# Patient Record
Sex: FEMALE | Race: WHITE
[De-identification: names, ages, dates, MRNs, and addresses within clinical notes are randomized per-mention and may not be internally consistent; named-entity substitution may affect disease eponyms.]

---

## 2017-05-08 ENCOUNTER — HOSPITAL ENCOUNTER (OUTPATIENT)
Dept: HOSPITAL 45 - C.RAD1850 | Age: 62
Discharge: HOME | End: 2017-05-08
Attending: FAMILY MEDICINE
Payer: COMMERCIAL

## 2017-05-08 DIAGNOSIS — R07.9: Primary | ICD-10-CM

## 2017-05-08 NOTE — DIAGNOSTIC IMAGING REPORT
LEFT RIBS UNILATERAL WITH PA CHEST



CLINICAL HISTORY: Left anterior rib pain    



COMPARISON STUDY:  No previous studies for comparison.



FINDINGS: The heart is mildly enlarged. No pneumothorax is visualized. There is

no focal pulmonary consolidation. No left-sided rib fractures are visualized.



IMPRESSION:  No evidence of pneumothorax. No left-sided rib fractures are

visualized. 









Electronically signed by:  Theodore Rizvi M.D.

5/8/2017 3:17 PM



Dictated Date/Time:  5/8/2017 3:16 PM

## 2017-12-18 ENCOUNTER — HOSPITAL ENCOUNTER (EMERGENCY)
Dept: HOSPITAL 45 - C.EDC | Age: 62
Discharge: HOME | End: 2017-12-18
Payer: COMMERCIAL

## 2017-12-18 VITALS
WEIGHT: 226.19 LBS | HEIGHT: 70 IN | HEIGHT: 70 IN | BODY MASS INDEX: 32.38 KG/M2 | BODY MASS INDEX: 32.38 KG/M2 | WEIGHT: 226.19 LBS

## 2017-12-18 VITALS — OXYGEN SATURATION: 98 %

## 2017-12-18 VITALS — HEART RATE: 88 BPM | OXYGEN SATURATION: 98 % | SYSTOLIC BLOOD PRESSURE: 115 MMHG | DIASTOLIC BLOOD PRESSURE: 79 MMHG

## 2017-12-18 VITALS — TEMPERATURE: 99.68 F

## 2017-12-18 DIAGNOSIS — F17.200: ICD-10-CM

## 2017-12-18 DIAGNOSIS — Z87.442: ICD-10-CM

## 2017-12-18 DIAGNOSIS — I10: ICD-10-CM

## 2017-12-18 DIAGNOSIS — Z80.9: ICD-10-CM

## 2017-12-18 DIAGNOSIS — R53.1: Primary | ICD-10-CM

## 2017-12-18 DIAGNOSIS — E87.1: ICD-10-CM

## 2017-12-18 DIAGNOSIS — J18.9: ICD-10-CM

## 2017-12-18 DIAGNOSIS — M79.669: ICD-10-CM

## 2017-12-18 DIAGNOSIS — Z79.899: ICD-10-CM

## 2017-12-18 LAB
ALP SERPL-CCNC: 106 U/L (ref 45–117)
ALT SERPL-CCNC: 19 U/L (ref 12–78)
ANION GAP SERPL CALC-SCNC: 11 MMOL/L (ref 3–11)
AST SERPL-CCNC: 15 U/L (ref 15–37)
BASOPHILS # BLD: 0.03 K/UL (ref 0–0.2)
BASOPHILS NFR BLD: 0.4 %
BUN SERPL-MCNC: 14 MG/DL (ref 7–18)
BUN/CREAT SERPL: 14.9 (ref 10–20)
CALCIUM SERPL-MCNC: 8.5 MG/DL (ref 8.5–10.1)
CHLORIDE SERPL-SCNC: 93 MMOL/L (ref 98–107)
CKMB/CK RATIO: (no result) (ref 0–3)
CO2 SERPL-SCNC: 22 MMOL/L (ref 21–32)
COMPLETE: YES
CREAT CL PREDICTED SERPL C-G-VRATE: 84.3 ML/MIN
CREAT SERPL-MCNC: 0.92 MG/DL (ref 0.6–1.2)
EOSINOPHIL NFR BLD AUTO: 166 K/UL (ref 130–400)
GLUCOSE SERPL-MCNC: 113 MG/DL (ref 70–99)
HCT VFR BLD CALC: 42.1 % (ref 37–47)
IG%: 0.1 %
IMM GRANULOCYTES NFR BLD AUTO: 21.8 %
INR PPP: 1 (ref 0.9–1.1)
LYMPHOCYTES # BLD: 1.52 K/UL (ref 1.2–3.4)
MAGNESIUM SERPL-MCNC: 2 MG/DL (ref 1.8–2.4)
MCH RBC QN AUTO: 33.3 PG (ref 25–34)
MCHC RBC AUTO-ENTMCNC: 36.3 G/DL (ref 32–36)
MCV RBC AUTO: 91.7 FL (ref 80–100)
MONOCYTES NFR BLD: 19.8 %
NEUTROPHILS # BLD AUTO: 0.3 %
NEUTROPHILS NFR BLD AUTO: 57.6 %
PARTIAL THROMBOPLASTIN RATIO: 1.2
PMV BLD AUTO: 11 FL (ref 7.4–10.4)
POTASSIUM SERPL-SCNC: 3.8 MMOL/L (ref 3.5–5.1)
PROTHROMBIN TIME: 10.7 SECONDS (ref 9–12)
RBC # BLD AUTO: 4.59 M/UL (ref 4.2–5.4)
SODIUM SERPL-SCNC: 126 MMOL/L (ref 136–145)
TSH SERPL-ACNC: 1.12 UIU/ML (ref 0.3–4.5)
WBC # BLD AUTO: 6.98 K/UL (ref 4.8–10.8)

## 2017-12-18 NOTE — DIAGNOSTIC IMAGING REPORT
CHEST ONE VIEW PORTABLE



CLINICAL HISTORY: Altered mental status. Weakness.    



COMPARISON STUDY:  11/10/2015



FINDINGS: The heart is the upper limits of normal in size. Since the prior

study, the patient has developed a right perihilar airspace opacity. In the

proper clinical setting, this represents a pneumonia. Underlying mass cannot be

excluded. Clinical and radiographic follow-up is recommended[ 



IMPRESSION: 

1. Interval development of a right perihilar airspace opacity. While likely

representing a focal pneumonia, an underlying mass cannot be excluded. Clinical

and radiographic follow-up is recommended







Electronically signed by:  Theodore Rizvi M.D.

12/18/2017 1:10 PM



Dictated Date/Time:  12/18/2017 1:08 PM

## 2017-12-18 NOTE — EMERGENCY ROOM VISIT NOTE
History


Report prepared by Silvio:  Etienne Currie


Under the Supervision of:  Dr. Esvin Prescott D.O.


First contact with patient:  12:04


Chief Complaint:  STROKE SYMPTOMS


Stated Complaint:  WEAKNESS


Nursing Triage Summary:  


since saturday pt has been having devon leg weakness, also had some nausea. this 


am about 0800 developed some dizzines lasted only briefly, felt better after 


sitting down. pt drove self to [facility] office. pt sent her by ALS for stroke sx 

and 


for a CT scan.





History of Present Illness


The patient is a 62 year old female who presents to the Emergency Room via ALS 

with complaints of persistent bilateral leg weakness that started 2 days ago. 

She states that she has had some nausea and coughing since then as well. The 

patient notes that she woke up around 4 hour ago with brief dizziness that 

resolved upon sitting down. She then drove herself to her doctor's office, and 

was then sent here for a stroke workup. The patient denies any leg pain, leg 

swelling, headaches, chest pain, shortness of breath, or vomiting. She does add 

that she has been urinating a lot more the past few days. She is a smoker.





   Source of History:  patient, nursing staff


   Onset:  2 days ago


   Position:  leg (bilateral)


   Quality:  other (weakness)


   Timing:  other (persistent)


   Associated Symptoms:  + cough, + nausea, + urinary symptoms (increased 

frequency), No headache, No chest pain, No SOB, No vomiting


Note:


Associated symptoms: Brief dizziness. Denies leg pain, leg swelling.





Review of Systems


See HPI for pertinent positives & negatives. A total of 10 systems reviewed and 

were otherwise negative.





Past Medical & Surgical


Medical Problems:


(1) HTN (hypertension)


(2) Kidney stones








Family History





Cancer


Lung disease





Social History


Smoking Status:  Current Some Day Smoker


Housing Status:  lives alone


Occupation Status:  employed





Current/Historical Medications


Scheduled


Flecainide (Tambocor), 150 MG PO Q12


Fluticasone Prop/Salmeterol (Advair Diskus 100/50 60 Dose), 1 PUFF INH BID


Gabapentin (Neurontin), 200 MG PO BID


Levofloxacin (Levaquin), 500 MG PO DAILY


Metoprolol Tartrate (Lopressor), 100 MG PO BID


Tiotropium Bromide (Spiriva Handihaler), 1 CAP INH BID





Allergies


Coded Allergies:  


     No Known Allergies (Unverified , 12/18/17)





Physical Exam


Vital Signs











  Date Time  Temp Pulse Resp B/P (MAP) Pulse Ox O2 Delivery O2 Flow Rate FiO2


 


12/18/17 14:30  88 16 115/79 98   


 


12/18/17 13:36  72 16 117/81 95 Room Air  


 


12/18/17 12:51  74      


 


12/18/17 12:26     98 Room Air  


 


12/18/17 12:26     98 Room Air  


 


12/18/17 12:26  75 16 145/76 98 Room Air  





  72  142/74    





  69  142/76    


 


12/18/17 12:05 37.6 82 20 129/75 95 Room Air  











Physical Exam


GENERAL:  Patient is awake, alert, and in no acute distress. Patient is resting 

comfortably and showing no signs of anxiety


EYES: The conjunctivae are clear.  The pupils are round and reactive. 


EARS, NOSE, MOUTH AND THROAT: The nose is without any evidence of any 

deformity. Mucous membranes are moist tongue is midline 


NECK: The neck is nontender and supple.


RESPIRATORY: Normal respiratory effort is noted there is no evidence of 

wheezing rhonchi or rales


CARDIOVASCULAR:  Regular rate and rhythm noted there no murmurs rubs or gallops 

normal S1 normal S2 


GASTROINTESTINAL: The abdomen is soft. Bowel sounds are present in all 

quadrants. Abdomen is nontender


MUSCULOSKELETAL/EXTREMITIES: There is no evidence of gross deformity full range 

of motion is noted in the hips and shoulders


SKIN: There is no obvious evidence of any rash. There are no petechiae, pallor 

or cyanosis noted. 


NEUROLOGIC:  Patient is awake alert and oriented x3 strength is symmetric 

patellar reflexes are 2+ bilaterally





Medical Decision & Procedures


ER Provider


Diagnostic Interpretation:


Radiology results as stated below per my review and radiologist interpretation:








CT HEAD WITHOUT CONTRAST (CT)





CLINICAL HISTORY: Altered mental status. Weakness.    





COMPARISON STUDY:  No previous studies for comparison.





TECHNIQUE:  Axial CT of the brain is performed from the vertex to the skull


base. IV contrast was not administered for this examination. A dose lowering


technique was utilized adhering to the principles of ALARA.


 





CT DOSE: 2006.90 mGycm





FINDINGS:





No intra or extra-axial mass lesions are visualized. There is no CT evidence of


acute cortical infarction. There is no evidence of midline shift. There is no


acute  hemorrhage. No calvarial fractures are visualized. 


There are minor white matter hypodensities likely on a small vessel basis.


There is no evidence of pathologic ventricular dilatation.


There is no evidence of acute sinusitis





IMPRESSION: No acute intracranial findings








Electronically signed by:  Theodore Rizvi M.D.


12/18/2017 1:08 PM





Dictated Date/Time:  12/18/2017 1:07 PM











CHEST ONE VIEW PORTABLE





CLINICAL HISTORY: Altered mental status. Weakness.    





COMPARISON STUDY:  11/10/2015





FINDINGS: The heart is the upper limits of normal in size. Since the prior


study, the patient has developed a right perihilar airspace opacity. In the


proper clinical setting, this represents a pneumonia. Underlying mass cannot be


excluded. Clinical and radiographic follow-up is recommended[ 





IMPRESSION: 


1. Interval development of a right perihilar airspace opacity. While likely


representing a focal pneumonia, an underlying mass cannot be excluded. Clinical


and radiographic follow-up is recommended








Electronically signed by:  Theodore Rizvi M.D.


12/18/2017 1:10 PM





Dictated Date/Time:  12/18/2017 1:08 PM














(CHEST FOR PE) ANGIO WITH





CLINICAL HISTORY: 62 years-old Female presenting with right-sided mass on chest


x-ray, bilateral leg weakness, nausea, dizziness, concern for stroke symptoms. 





TECHNIQUE: Multidetector CT angiography of the chest was performed after


administration of intravenous contrast. 3-D volumetric and/or maximum intensity


projection (MIP) images were subsequently reconstructed for review. IV contrast:


120 mL of Optiray 320. A dose lowering technique was used consistent with the


principles of ALARA (as low as reasonably achievable).





COMPARISON: Chest x-ray performed earlier the same day and CTA from 3/23/2013.





CT DOSE (mGy.cm): The estimated cumulative dose is 710.10 mGy.cm.





FINDINGS:





 topogram: Right lung opacity.





Pulmonary vasculature:





The study is adequate for assessment of the pulmonary vascular tree. No filling


defect within the pulmonary arteries to suggest embolus. Main pulmonary artery


is not enlarged. No flattening of the interventricular septum. No intracardiac


intracardiac filling defect. No reflux of contrast into the hepatic veins.





Remaining chest:





On soft tissue windows, normal thyroid and thoracic inlet. No axillary,


supraclavicular, hilar, or mediastinal lymphadenopathy. Atherosclerosis of the


aorta. Mild multichamber enlargement of the heart. No pericardial or pleural


effusion. Upper abdomen normal.





On lung windows, dependent changes in the lungs. Additionally, focal


consolidation in the posterior right upper lobe abutting the minor fissure and


major fissure. Associated bronchial wall thickening and partial obstruction of


subsegmental airways. Extension of centrilobular groundglass opacities towards


the right apex. Minimal groundglass opacity in the right middle lobe. The left


lung is clear apart from atelectasis at the lung base.





On bone windows, degenerative changes of the spine.





IMPRESSION:


1.  No evidence of pulmonary embolus.





2.  Focal consolidation primarily in the posterior right upper lobe. This is


consistent with pneumonia. Resultant bronchial wall thickening and mucous


plugging. Minimal extension of infection to the right apex and right middle


lobe.








Electronically signed by:  Shane Mancia M.D.


12/18/2017 2:14 PM





Dictated Date/Time:  12/18/2017 2:07 PM





Laboratory Results


12/18/17 12:35








Red Blood Count 4.59, Mean Corpuscular Volume 91.7, Mean Corpuscular Hemoglobin 

33.3, Mean Corpuscular Hemoglobin Concent 36.3, Mean Platelet Volume 11.0, 

Neutrophils (%) (Auto) 57.6, Lymphocytes (%) (Auto) 21.8, Monocytes (%) (Auto) 

19.8, Eosinophils (%) (Auto) 0.3, Basophils (%) (Auto) 0.4, Neutrophils # (Auto

) 4.02, Lymphocytes # (Auto) 1.52, Monocytes # (Auto) 1.38, Eosinophils # (Auto

) 0.02, Basophils # (Auto) 0.03





12/18/17 12:35

















Test


  12/18/17


12:35


 


White Blood Count


  6.98 K/uL


(4.8-10.8)


 


Red Blood Count


  4.59 M/uL


(4.2-5.4)


 


Hemoglobin


  15.3 g/dL


(12.0-16.0)


 


Hematocrit 42.1 % (37-47) 


 


Mean Corpuscular Volume


  91.7 fL


()


 


Mean Corpuscular Hemoglobin


  33.3 pg


(25-34)


 


Mean Corpuscular Hemoglobin


Concent 36.3 g/dl


(32-36)


 


Platelet Count


  166 K/uL


(130-400)


 


Mean Platelet Volume


  11.0 fL


(7.4-10.4)


 


Neutrophils (%) (Auto) 57.6 % 


 


Lymphocytes (%) (Auto) 21.8 % 


 


Monocytes (%) (Auto) 19.8 % 


 


Eosinophils (%) (Auto) 0.3 % 


 


Basophils (%) (Auto) 0.4 % 


 


Neutrophils # (Auto)


  4.02 K/uL


(1.4-6.5)


 


Lymphocytes # (Auto)


  1.52 K/uL


(1.2-3.4)


 


Monocytes # (Auto)


  1.38 K/uL


(0.11-0.59)


 


Eosinophils # (Auto)


  0.02 K/uL


(0-0.5)


 


Basophils # (Auto)


  0.03 K/uL


(0-0.2)


 


RDW Standard Deviation


  40.7 fL


(36.4-46.3)


 


RDW Coefficient of Variation


  12.1 %


(11.5-14.5)


 


Immature Granulocyte % (Auto) 0.1 % 


 


Immature Granulocyte # (Auto)


  0.01 K/uL


(0.00-0.02)


 


Prothrombin Time


  10.7 SECONDS


(9.0-12.0)


 


Prothromb Time International


Ratio 1.0 (0.9-1.1) 


 


 


Activated Partial


Thromboplast Time 30.5 SECONDS


(21.0-31.0)


 


Partial Thromboplastin Ratio 1.2 


 


Anion Gap


  11.0 mmol/L


(3-11)


 


Est Creatinine Clear Calc


Drug Dose 84.3 ml/min 


 


 


Estimated GFR (


American) 78.4 


 


 


Estimated GFR (Non-


American 67.7 


 


 


BUN/Creatinine Ratio 14.9 (10-20) 


 


Calcium Level


  8.5 mg/dl


(8.5-10.1)


 


Magnesium Level


  2.0 mg/dl


(1.8-2.4)


 


Total Bilirubin


  0.6 mg/dl


(0.2-1)


 


Direct Bilirubin


  0.1 mg/dl


(0-0.2)


 


Aspartate Amino Transf


(AST/SGOT) 15 U/L (15-37) 


 


 


Alanine Aminotransferase


(ALT/SGPT) 19 U/L (12-78) 


 


 


Alkaline Phosphatase


  106 U/L


()


 


Total Creatine Kinase


  80 U/L


()


 


Creatine Kinase MB


  < 0.5 ng/ml


(0.5-3.6)


 


Creatine Kinase MB Ratio  (0-3.0) 


 


Troponin I


  < 0.015 ng/ml


(0-0.045)


 


Total Protein


  7.4 gm/dl


(6.4-8.2)


 


Albumin


  3.6 gm/dl


(3.4-5.0)


 


Thyroid Stimulating Hormone


(TSH) 1.120 uIu/ml


(0.300-4.500)





Laboratory results per my review.





Medications Administered











 Medications


  (Trade)  Dose


 Ordered  Sig/Ricardo


 Route  Start Time


 Stop Time Status Last Admin


Dose Admin


 


 Levofloxacin


  (Levaquin Tab)  500 mg  NOW  STAT


 PO  12/18/17 14:26


 12/18/17 14:27 DC 12/18/17 14:54


500 MG











ECG


Indication:  weakness


Rate (beats per minute):  75


Rhythm:  normal sinus


Findings:  PAC (frequent), ST depression (diffuse)


Change:  no significant change (3/23/13)





ED Course


1205: The patient was evaluated in room C12A. A complete history and physical 

examination were performed. 





1318: I reevaluated and updated the patient.





1426: Ordered Levaquin Tab 500 mg PO.





1429: Upon reevaluation, the patient is resting. I talked to the  

to see if they could set the patient up for an appointment. I discussed the 

results and treatment plan with her. She verbalized agreement of the treatment 

plan. She was discharged home.





Medical Decision





Differential diagnosis:


Etiologies such as metabolic, infection, hypo/hyperglycemia, electrolyte 

abnormalities, cardiac sources, intracerebral event, toxicologic, neurologic, 

as well as others were entertained. 





Nursing notes reviewed.





The patient is a 62-year-old female who presented to the emergency department 

for an evaluation of lower extremity pain and weakness. The patient was seen by 

her primary care physician and was sent to the emergency department for the 

possibility of stroke even though she had a normal neurologic exam. The patient 

also complained of cough. She had an abnormality noted on chest x-ray. Given 

her other complaints as well as her hyponatremia was concerned this could 

represent a pulmonary mass. CT the chest was obtained but showed signs of 

infiltrate. The patient was started on antibiotics in emergency department. I 

discussed the patient's laboratory and radiographic studies with her. I do feel 

she may require further workup for risk stratification for stroke such as 

echocardiogram and carotid Dopplers but at this time I do not feel that she 

requires admission for these tests. I discussed her case with the  

and they were able to get the patient a follow-up appointment to have other 

tests scheduled. She was encouraged to continue all medications as prescribed 

and rest. She was also encouraged to return to the emergency department 

immediately symptoms change worsen or the need arises.





Medication Reconcilliation


Current Medication List:  was personally reviewed by me





Blood Pressure Screening


Patient's blood pressure:  Normal blood pressure





Impression





 Primary Impression:  


 Weakness


 Additional Impressions:  


 PNA (pneumonia)


 Hyponatremia





Scribe Attestation


The scribe's documentation has been prepared under my direction and personally 

reviewed by me in its entirety. I confirm that the note above accurately 

reflects all work, treatment, procedures, and medical decision making performed 

by me.





Departure Information


Dispostion


Home / Self-Care





Prescriptions





Levofloxacin (Levaquin) 500 Mg Tab


500 MG PO DAILY, #10 TAB


   Prov: Esvin Prescott, DO         12/18/17





Referrals


Twila Man M.D. (PCP)





Forms


HOME CARE DOCUMENTATION FORM,                                                 

               IMPORTANT VISIT INFORMATION, Work Instructions





Patient Instructions


[facility]on[facility], [facility], Pneumonia





Additional Instructions





Continue all medications as prescribed. Follow-up with your family doctor as 

scheduled. Rest and avoid any strenuous activity. Return to the emergency 

department if symptoms worsen or if need arises. Discussed the possibility with 

your family doctor that you may require further studies such as an 

echocardiogram and carotid Dopplers to further evaluate your risk for stroke in 

the future.





Problem Qualifiers








 Additional Impressions:  


 PNA (pneumonia)


 Pneumonia type:  due to unspecified organism  Laterality:  unspecified 

laterality  Lung location:  unspecified part of lung  Qualified Codes:  J18.9 - 

Pneumonia, unspecified organism

## 2017-12-18 NOTE — DIAGNOSTIC IMAGING REPORT
CT HEAD WITHOUT CONTRAST (CT)



CLINICAL HISTORY: Altered mental status. Weakness.    



COMPARISON STUDY:  No previous studies for comparison.



TECHNIQUE:  Axial CT of the brain is performed from the vertex to the skull

base. IV contrast was not administered for this examination. A dose lowering

technique was utilized adhering to the principles of ALARA.

 



CT DOSE: 2006.90 mGycm



FINDINGS:



No intra or extra-axial mass lesions are visualized. There is no CT evidence of

acute cortical infarction. There is no evidence of midline shift. There is no

acute  hemorrhage. No calvarial fractures are visualized. 

There are minor white matter hypodensities likely on a small vessel basis.

There is no evidence of pathologic ventricular dilatation.

There is no evidence of acute sinusitis



IMPRESSION: No acute intracranial findings







Electronically signed by:  Theodore Rizvi M.D.

12/18/2017 1:08 PM



Dictated Date/Time:  12/18/2017 1:07 PM

## 2017-12-18 NOTE — DIAGNOSTIC IMAGING REPORT
(CHEST FOR PE) ANGIO WITH



CLINICAL HISTORY: 62 years-old Female presenting with right-sided mass on chest

x-ray, bilateral leg weakness, nausea, dizziness, concern for stroke symptoms. 



TECHNIQUE: Multidetector CT angiography of the chest was performed after

administration of intravenous contrast. 3-D volumetric and/or maximum intensity

projection (MIP) images were subsequently reconstructed for review. IV contrast:

120 mL of Optiray 320. A dose lowering technique was used consistent with the

principles of ALARA (as low as reasonably achievable).



COMPARISON: Chest x-ray performed earlier the same day and CTA from 3/23/2013.



CT DOSE (mGy.cm): The estimated cumulative dose is 710.10 mGy.cm.



FINDINGS:



 topogram: Right lung opacity.



Pulmonary vasculature:



The study is adequate for assessment of the pulmonary vascular tree. No filling

defect within the pulmonary arteries to suggest embolus. Main pulmonary artery

is not enlarged. No flattening of the interventricular septum. No intracardiac

intracardiac filling defect. No reflux of contrast into the hepatic veins.



Remaining chest:



On soft tissue windows, normal thyroid and thoracic inlet. No axillary,

supraclavicular, hilar, or mediastinal lymphadenopathy. Atherosclerosis of the

aorta. Mild multichamber enlargement of the heart. No pericardial or pleural

effusion. Upper abdomen normal.



On lung windows, dependent changes in the lungs. Additionally, focal

consolidation in the posterior right upper lobe abutting the minor fissure and

major fissure. Associated bronchial wall thickening and partial obstruction of

subsegmental airways. Extension of centrilobular groundglass opacities towards

the right apex. Minimal groundglass opacity in the right middle lobe. The left

lung is clear apart from atelectasis at the lung base.



On bone windows, degenerative changes of the spine.



IMPRESSION:

1.  No evidence of pulmonary embolus.



2.  Focal consolidation primarily in the posterior right upper lobe. This is

consistent with pneumonia. Resultant bronchial wall thickening and mucous

plugging. Minimal extension of infection to the right apex and right middle

lobe.







Electronically signed by:  Shane Mancia M.D.

12/18/2017 2:14 PM



Dictated Date/Time:  12/18/2017 2:07 PM

## 2018-05-09 ENCOUNTER — HOSPITAL ENCOUNTER (OUTPATIENT)
Dept: HOSPITAL 45 - C.RDSM | Age: 63
Discharge: HOME | End: 2018-05-09
Attending: PHYSICIAN ASSISTANT
Payer: COMMERCIAL

## 2018-05-09 DIAGNOSIS — R52: Primary | ICD-10-CM

## 2018-05-09 NOTE — DIAGNOSTIC IMAGING REPORT
L SHOULDER MIN 2 VIEWS



CLINICAL HISTORY: LEFT SHOULDER PAIN pain



COMPARISON: None.



DISCUSSION: Mild degenerative change of the glenohumeral joint. No abnormal soft

tissue calcifications. No evidence for fracture or dislocation. There is no

evidence for soft tissue swelling.



IMPRESSION: Minimal/mild degenerative change. No acute process.











The above report was generated using voice recognition software.  It may contain

grammatical, syntax or spelling errors.







Electronically signed by:  Musa Coreas M.D.

5/9/2018 11:32 AM



Dictated Date/Time:  5/9/2018 11:32 AM

## 2018-08-21 ENCOUNTER — HOSPITAL ENCOUNTER (OUTPATIENT)
Dept: HOSPITAL 45 - C.PAPS | Age: 63
Discharge: HOME | End: 2018-08-21
Attending: OBSTETRICS & GYNECOLOGY
Payer: COMMERCIAL

## 2018-08-21 DIAGNOSIS — Z01.419: Primary | ICD-10-CM
